# Patient Record
Sex: MALE | Race: WHITE | Employment: FULL TIME | URBAN - METROPOLITAN AREA
[De-identification: names, ages, dates, MRNs, and addresses within clinical notes are randomized per-mention and may not be internally consistent; named-entity substitution may affect disease eponyms.]

---

## 2019-08-14 ENCOUNTER — OFFICE VISIT (OUTPATIENT)
Dept: CARDIOLOGY CLINIC | Age: 54
End: 2019-08-14
Payer: COMMERCIAL

## 2019-08-14 VITALS
SYSTOLIC BLOOD PRESSURE: 118 MMHG | WEIGHT: 206 LBS | BODY MASS INDEX: 25.61 KG/M2 | HEIGHT: 75 IN | DIASTOLIC BLOOD PRESSURE: 82 MMHG | HEART RATE: 63 BPM | RESPIRATION RATE: 16 BRPM

## 2019-08-14 DIAGNOSIS — I51.9 HEART PROBLEM: Primary | ICD-10-CM

## 2019-08-14 PROCEDURE — 93000 ELECTROCARDIOGRAM COMPLETE: CPT | Performed by: INTERNAL MEDICINE

## 2019-08-14 PROCEDURE — 3017F COLORECTAL CA SCREEN DOC REV: CPT | Performed by: INTERNAL MEDICINE

## 2019-08-14 PROCEDURE — G8427 DOCREV CUR MEDS BY ELIG CLIN: HCPCS | Performed by: INTERNAL MEDICINE

## 2019-08-14 PROCEDURE — 1036F TOBACCO NON-USER: CPT | Performed by: INTERNAL MEDICINE

## 2019-08-14 PROCEDURE — 99214 OFFICE O/P EST MOD 30 MIN: CPT | Performed by: INTERNAL MEDICINE

## 2019-08-14 PROCEDURE — G8419 CALC BMI OUT NRM PARAM NOF/U: HCPCS | Performed by: INTERNAL MEDICINE

## 2019-08-14 RX ORDER — BUDESONIDE AND FORMOTEROL FUMARATE DIHYDRATE 160; 4.5 UG/1; UG/1
1 AEROSOL RESPIRATORY (INHALATION) DAILY
COMMUNITY

## 2019-08-14 NOTE — PROGRESS NOTES
CHIEF COMPLAINT: VHD    HISTORY OF PRESENT ILLNESS: Patient is a 49 y.o. male seen at the request of Steph Gibbs DO. No CP. Some episodic SOB attributed to his COPD issues. Medical history includes:   1. Asymptomatic heart murmur noted late 2006.  2. 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 3. CURTIS, 11/30/2006 with degeneration of the middle scallop of the posterior leaflet associated with severe MR, LAE. 4. Cardiac catheterization, 12/07/2006 with normal coronary arteries. Wedge pressure 18 mmHg. RCA dominant. Moderate to severe MR seen with faint filling of pulmonary veins. 5. Remote history of vasectomy. No other surgical procedures. 6. Five to ten cigarettes per day smoking habit, abstinent since 01/2007. One to two beers per day. 7. No drug allergies. 8. No history of MI, rheumatic fever, diabetes mellitus, hypertension or CHF. 9. MV repair, 01/04/2007, Dr. Tushar Lugo, [facility], with a #27 Boland ring. No significant complications. 10. Echo, 03/06/2007 with normal LV size and thickness. Borderline global hypokinesis. EF 40-45%. No significant MR or TR. Normal RVSP. 11. Echo, 02/04/2009 with normal LV size and thickness. Borderline global hypokinesis, EF 40-50%. Moderate LAE, mild CANDELARIA and RVE. Stage II diastolic dysfunction with elevated LA pressure. Normal RVSP. Normal mitral ring with no stenosis. Mild to moderate MR present. 12. Significant improvement in dyspnea 2009 with addition of Altace 5 mg qd. 13. Echo, 04/12/2010. Normal LV size and wall thickness with abnormal septal motion consistent with postop state. EF about 50-55%. No significant mitral stenosis or insufficiency. Trivial TR with normal RVSP. Marked YUNIER. Stage II LV diastolic relaxation abnormalities.

## 2019-10-01 ENCOUNTER — PREP FOR PROCEDURE (OUTPATIENT)
Dept: SURGERY | Age: 54
End: 2019-10-01

## 2019-10-01 RX ORDER — SODIUM CHLORIDE 0.9 % (FLUSH) 0.9 %
10 SYRINGE (ML) INJECTION EVERY 12 HOURS SCHEDULED
Status: CANCELLED | OUTPATIENT
Start: 2019-10-01

## 2019-10-01 RX ORDER — SODIUM CHLORIDE, SODIUM LACTATE, POTASSIUM CHLORIDE, CALCIUM CHLORIDE 600; 310; 30; 20 MG/100ML; MG/100ML; MG/100ML; MG/100ML
INJECTION, SOLUTION INTRAVENOUS CONTINUOUS
Status: CANCELLED | OUTPATIENT
Start: 2019-10-01

## 2019-10-04 ENCOUNTER — HOSPITAL ENCOUNTER (OUTPATIENT)
Dept: PREADMISSION TESTING | Age: 54
Discharge: HOME | End: 2019-10-04
Payer: COMMERCIAL

## 2019-10-04 LAB
ANION GAP SERPL CALCULATED.3IONS-SCNC: 11 MMOL/L (ref 7–16)
BUN BLDV-MCNC: 11 MG/DL (ref 6–20)
CALCIUM SERPL-MCNC: 9.5 MG/DL (ref 8.6–10.2)
CHLORIDE BLD-SCNC: 99 MMOL/L (ref 98–107)
CO2: 29 MMOL/L (ref 22–29)
CREAT SERPL-MCNC: 1 MG/DL (ref 0.7–1.2)
GFR AFRICAN AMERICAN: >60
GFR NON-AFRICAN AMERICAN: >60 ML/MIN/1.73
GLUCOSE BLD-MCNC: 115 MG/DL (ref 74–99)
HCT VFR BLD CALC: 45.9 % (ref 37–54)
HEMOGLOBIN: 15.5 G/DL (ref 12.5–16.5)
MCH RBC QN AUTO: 31.8 PG (ref 26–35)
MCHC RBC AUTO-ENTMCNC: 33.8 % (ref 32–34.5)
MCV RBC AUTO: 94.1 FL (ref 80–99.9)
PDW BLD-RTO: 11.8 FL (ref 11.5–15)
PLATELET # BLD: 184 E9/L (ref 130–450)
PMV BLD AUTO: 9.8 FL (ref 7–12)
POTASSIUM SERPL-SCNC: 4 MMOL/L (ref 3.5–5)
RBC # BLD: 4.88 E12/L (ref 3.8–5.8)
SODIUM BLD-SCNC: 139 MMOL/L (ref 132–146)
WBC # BLD: 4.4 E9/L (ref 4.5–11.5)

## 2019-10-04 PROCEDURE — 36415 COLL VENOUS BLD VENIPUNCTURE: CPT

## 2019-10-04 PROCEDURE — 80048 BASIC METABOLIC PNL TOTAL CA: CPT

## 2019-10-04 PROCEDURE — 85027 COMPLETE CBC AUTOMATED: CPT

## 2019-10-08 ENCOUNTER — ANESTHESIA (OUTPATIENT)
Dept: OPERATING ROOM | Age: 54
End: 2019-10-08
Payer: COMMERCIAL

## 2019-10-08 ENCOUNTER — HOSPITAL ENCOUNTER (OUTPATIENT)
Age: 54
Setting detail: OUTPATIENT SURGERY
Discharge: HOME | End: 2019-10-08
Attending: SURGERY | Admitting: SURGERY
Payer: COMMERCIAL

## 2019-10-08 ENCOUNTER — ANESTHESIA EVENT (OUTPATIENT)
Dept: OPERATING ROOM | Age: 54
End: 2019-10-08
Payer: COMMERCIAL

## 2019-10-08 VITALS
HEIGHT: 75 IN | SYSTOLIC BLOOD PRESSURE: 128 MMHG | WEIGHT: 203 LBS | DIASTOLIC BLOOD PRESSURE: 82 MMHG | TEMPERATURE: 98.2 F | RESPIRATION RATE: 18 BRPM | HEART RATE: 67 BPM | BODY MASS INDEX: 25.24 KG/M2 | OXYGEN SATURATION: 96 %

## 2019-10-08 VITALS
RESPIRATION RATE: 23 BRPM | DIASTOLIC BLOOD PRESSURE: 80 MMHG | OXYGEN SATURATION: 100 % | SYSTOLIC BLOOD PRESSURE: 132 MMHG | TEMPERATURE: 96.1 F

## 2019-10-08 DIAGNOSIS — G89.18 POST-OP PAIN: Primary | ICD-10-CM

## 2019-10-08 PROCEDURE — 3700000000 HC ANESTHESIA ATTENDED CARE: Performed by: SURGERY

## 2019-10-08 PROCEDURE — 6360000002 HC RX W HCPCS: Performed by: SURGERY

## 2019-10-08 PROCEDURE — 7100000001 HC PACU RECOVERY - ADDTL 15 MIN: Performed by: SURGERY

## 2019-10-08 PROCEDURE — 7100000000 HC PACU RECOVERY - FIRST 15 MIN: Performed by: SURGERY

## 2019-10-08 PROCEDURE — 7100000011 HC PHASE II RECOVERY - ADDTL 15 MIN: Performed by: SURGERY

## 2019-10-08 PROCEDURE — 3600000009 HC SURGERY ROBOT BASE: Performed by: SURGERY

## 2019-10-08 PROCEDURE — 6370000000 HC RX 637 (ALT 250 FOR IP): Performed by: ANESTHESIOLOGY

## 2019-10-08 PROCEDURE — 6360000002 HC RX W HCPCS: Performed by: NURSE ANESTHETIST, CERTIFIED REGISTERED

## 2019-10-08 PROCEDURE — 7100000010 HC PHASE II RECOVERY - FIRST 15 MIN: Performed by: SURGERY

## 2019-10-08 PROCEDURE — 6360000002 HC RX W HCPCS: Performed by: ANESTHESIOLOGY

## 2019-10-08 PROCEDURE — 3700000001 HC ADD 15 MINUTES (ANESTHESIA): Performed by: SURGERY

## 2019-10-08 PROCEDURE — 2500000003 HC RX 250 WO HCPCS: Performed by: NURSE ANESTHETIST, CERTIFIED REGISTERED

## 2019-10-08 PROCEDURE — 3600000019 HC SURGERY ROBOT ADDTL 15MIN: Performed by: SURGERY

## 2019-10-08 PROCEDURE — 2500000003 HC RX 250 WO HCPCS: Performed by: SURGERY

## 2019-10-08 PROCEDURE — S2900 ROBOTIC SURGICAL SYSTEM: HCPCS | Performed by: SURGERY

## 2019-10-08 PROCEDURE — 2580000003 HC RX 258: Performed by: SURGERY

## 2019-10-08 PROCEDURE — C1781 MESH (IMPLANTABLE): HCPCS | Performed by: SURGERY

## 2019-10-08 PROCEDURE — 2709999900 HC NON-CHARGEABLE SUPPLY: Performed by: SURGERY

## 2019-10-08 DEVICE — MESH HERN W10XL15CM POLY POLYLACTIC ACID 70% CLLGN 30% GLYC: Type: IMPLANTABLE DEVICE | Site: ABDOMEN | Status: FUNCTIONAL

## 2019-10-08 RX ORDER — OXYCODONE HYDROCHLORIDE AND ACETAMINOPHEN 5; 325 MG/1; MG/1
2 TABLET ORAL PRN
Status: COMPLETED | OUTPATIENT
Start: 2019-10-08 | End: 2019-10-08

## 2019-10-08 RX ORDER — DOCUSATE SODIUM 100 MG/1
100 CAPSULE, LIQUID FILLED ORAL 2 TIMES DAILY
Qty: 50 CAPSULE | Refills: 0 | Status: SHIPPED | OUTPATIENT
Start: 2019-10-08 | End: 2020-09-18

## 2019-10-08 RX ORDER — BUPIVACAINE HYDROCHLORIDE 5 MG/ML
INJECTION, SOLUTION EPIDURAL; INTRACAUDAL PRN
Status: DISCONTINUED | OUTPATIENT
Start: 2019-10-08 | End: 2019-10-08 | Stop reason: ALTCHOICE

## 2019-10-08 RX ORDER — LIDOCAINE HYDROCHLORIDE 10 MG/ML
INJECTION, SOLUTION INFILTRATION; PERINEURAL PRN
Status: DISCONTINUED | OUTPATIENT
Start: 2019-10-08 | End: 2019-10-08 | Stop reason: ALTCHOICE

## 2019-10-08 RX ORDER — OXYCODONE HYDROCHLORIDE AND ACETAMINOPHEN 5; 325 MG/1; MG/1
1 TABLET ORAL PRN
Status: COMPLETED | OUTPATIENT
Start: 2019-10-08 | End: 2019-10-08

## 2019-10-08 RX ORDER — NEOSTIGMINE METHYLSULFATE 1 MG/ML
INJECTION, SOLUTION INTRAVENOUS PRN
Status: DISCONTINUED | OUTPATIENT
Start: 2019-10-08 | End: 2019-10-08 | Stop reason: SDUPTHER

## 2019-10-08 RX ORDER — ONDANSETRON 2 MG/ML
INJECTION INTRAMUSCULAR; INTRAVENOUS PRN
Status: DISCONTINUED | OUTPATIENT
Start: 2019-10-08 | End: 2019-10-08 | Stop reason: SDUPTHER

## 2019-10-08 RX ORDER — HYDROCODONE BITARTRATE AND ACETAMINOPHEN 5; 325 MG/1; MG/1
1 TABLET ORAL EVERY 6 HOURS PRN
Qty: 28 TABLET | Refills: 0 | Status: SHIPPED | OUTPATIENT
Start: 2019-10-08 | End: 2019-10-15

## 2019-10-08 RX ORDER — EPHEDRINE SULFATE/0.9% NACL/PF 50 MG/5 ML
SYRINGE (ML) INTRAVENOUS PRN
Status: DISCONTINUED | OUTPATIENT
Start: 2019-10-08 | End: 2019-10-08 | Stop reason: SDUPTHER

## 2019-10-08 RX ORDER — CEFAZOLIN SODIUM 2 G/50ML
2 SOLUTION INTRAVENOUS
Status: COMPLETED | OUTPATIENT
Start: 2019-10-08 | End: 2019-10-08

## 2019-10-08 RX ORDER — ROCURONIUM BROMIDE 10 MG/ML
INJECTION, SOLUTION INTRAVENOUS PRN
Status: DISCONTINUED | OUTPATIENT
Start: 2019-10-08 | End: 2019-10-08 | Stop reason: SDUPTHER

## 2019-10-08 RX ORDER — MIDAZOLAM HYDROCHLORIDE 1 MG/ML
INJECTION INTRAMUSCULAR; INTRAVENOUS PRN
Status: DISCONTINUED | OUTPATIENT
Start: 2019-10-08 | End: 2019-10-08 | Stop reason: SDUPTHER

## 2019-10-08 RX ORDER — DEXAMETHASONE SODIUM PHOSPHATE 4 MG/ML
INJECTION, SOLUTION INTRA-ARTICULAR; INTRALESIONAL; INTRAMUSCULAR; INTRAVENOUS; SOFT TISSUE PRN
Status: DISCONTINUED | OUTPATIENT
Start: 2019-10-08 | End: 2019-10-08 | Stop reason: SDUPTHER

## 2019-10-08 RX ORDER — FENTANYL CITRATE 50 UG/ML
INJECTION, SOLUTION INTRAMUSCULAR; INTRAVENOUS PRN
Status: DISCONTINUED | OUTPATIENT
Start: 2019-10-08 | End: 2019-10-08 | Stop reason: SDUPTHER

## 2019-10-08 RX ORDER — GLYCOPYRROLATE 0.2 MG/ML
INJECTION INTRAMUSCULAR; INTRAVENOUS PRN
Status: DISCONTINUED | OUTPATIENT
Start: 2019-10-08 | End: 2019-10-08 | Stop reason: SDUPTHER

## 2019-10-08 RX ORDER — PROPOFOL 10 MG/ML
INJECTION, EMULSION INTRAVENOUS PRN
Status: DISCONTINUED | OUTPATIENT
Start: 2019-10-08 | End: 2019-10-08 | Stop reason: SDUPTHER

## 2019-10-08 RX ORDER — LIDOCAINE HYDROCHLORIDE 20 MG/ML
INJECTION, SOLUTION EPIDURAL; INFILTRATION; INTRACAUDAL; PERINEURAL PRN
Status: DISCONTINUED | OUTPATIENT
Start: 2019-10-08 | End: 2019-10-08 | Stop reason: SDUPTHER

## 2019-10-08 RX ORDER — SODIUM CHLORIDE 0.9 % (FLUSH) 0.9 %
10 SYRINGE (ML) INJECTION EVERY 12 HOURS SCHEDULED
Status: DISCONTINUED | OUTPATIENT
Start: 2019-10-08 | End: 2019-10-08 | Stop reason: HOSPADM

## 2019-10-08 RX ORDER — MEPERIDINE HYDROCHLORIDE 25 MG/ML
12.5 INJECTION INTRAMUSCULAR; INTRAVENOUS; SUBCUTANEOUS EVERY 5 MIN PRN
Status: DISCONTINUED | OUTPATIENT
Start: 2019-10-08 | End: 2019-10-08 | Stop reason: HOSPADM

## 2019-10-08 RX ORDER — DIPHENHYDRAMINE HYDROCHLORIDE 50 MG/ML
12.5 INJECTION INTRAMUSCULAR; INTRAVENOUS
Status: DISCONTINUED | OUTPATIENT
Start: 2019-10-08 | End: 2019-10-08 | Stop reason: HOSPADM

## 2019-10-08 RX ORDER — ONDANSETRON 4 MG/1
4 TABLET, FILM COATED ORAL DAILY PRN
Qty: 12 TABLET | Refills: 0 | Status: SHIPPED | OUTPATIENT
Start: 2019-10-08 | End: 2019-10-20

## 2019-10-08 RX ORDER — SODIUM CHLORIDE, SODIUM LACTATE, POTASSIUM CHLORIDE, CALCIUM CHLORIDE 600; 310; 30; 20 MG/100ML; MG/100ML; MG/100ML; MG/100ML
INJECTION, SOLUTION INTRAVENOUS CONTINUOUS
Status: DISCONTINUED | OUTPATIENT
Start: 2019-10-08 | End: 2019-10-08 | Stop reason: HOSPADM

## 2019-10-08 RX ADMIN — OXYCODONE HYDROCHLORIDE AND ACETAMINOPHEN 1 TABLET: 5; 325 TABLET ORAL at 10:45

## 2019-10-08 RX ADMIN — PROPOFOL 200 MG: 10 INJECTION, EMULSION INTRAVENOUS at 07:27

## 2019-10-08 RX ADMIN — LIDOCAINE HYDROCHLORIDE 100 MG: 20 INJECTION, SOLUTION EPIDURAL; INFILTRATION; INTRACAUDAL; PERINEURAL at 07:27

## 2019-10-08 RX ADMIN — ROCURONIUM BROMIDE 10 MG: 10 SOLUTION INTRAVENOUS at 08:10

## 2019-10-08 RX ADMIN — Medication 20 MG: at 08:30

## 2019-10-08 RX ADMIN — Medication 3 MG: at 08:40

## 2019-10-08 RX ADMIN — FENTANYL CITRATE 100 MCG: 50 INJECTION, SOLUTION INTRAMUSCULAR; INTRAVENOUS at 07:46

## 2019-10-08 RX ADMIN — GLYCOPYRROLATE 0.6 MG: 0.2 INJECTION, SOLUTION INTRAMUSCULAR; INTRAVENOUS at 08:40

## 2019-10-08 RX ADMIN — MIDAZOLAM HYDROCHLORIDE 2 MG: 1 INJECTION, SOLUTION INTRAMUSCULAR; INTRAVENOUS at 07:19

## 2019-10-08 RX ADMIN — HYDROMORPHONE HYDROCHLORIDE 0.25 MG: 1 INJECTION, SOLUTION INTRAMUSCULAR; INTRAVENOUS; SUBCUTANEOUS at 09:17

## 2019-10-08 RX ADMIN — ONDANSETRON HYDROCHLORIDE 4 MG: 2 INJECTION, SOLUTION INTRAMUSCULAR; INTRAVENOUS at 07:37

## 2019-10-08 RX ADMIN — CEFAZOLIN SODIUM 2 G: 2 SOLUTION INTRAVENOUS at 07:19

## 2019-10-08 RX ADMIN — SODIUM CHLORIDE, POTASSIUM CHLORIDE, SODIUM LACTATE AND CALCIUM CHLORIDE: 600; 310; 30; 20 INJECTION, SOLUTION INTRAVENOUS at 07:19

## 2019-10-08 RX ADMIN — FENTANYL CITRATE 100 MCG: 50 INJECTION, SOLUTION INTRAMUSCULAR; INTRAVENOUS at 07:27

## 2019-10-08 RX ADMIN — ROCURONIUM BROMIDE 40 MG: 10 SOLUTION INTRAVENOUS at 07:27

## 2019-10-08 RX ADMIN — DEXAMETHASONE SODIUM PHOSPHATE 8 MG: 4 INJECTION, SOLUTION INTRAMUSCULAR; INTRAVENOUS at 07:37

## 2019-10-08 RX ADMIN — FENTANYL CITRATE 50 MCG: 50 INJECTION, SOLUTION INTRAMUSCULAR; INTRAVENOUS at 07:42

## 2019-10-08 ASSESSMENT — PULMONARY FUNCTION TESTS
PIF_VALUE: 26
PIF_VALUE: 1
PIF_VALUE: 37
PIF_VALUE: 24
PIF_VALUE: 14
PIF_VALUE: 23
PIF_VALUE: 35
PIF_VALUE: 37
PIF_VALUE: 30
PIF_VALUE: 23
PIF_VALUE: 1
PIF_VALUE: 38
PIF_VALUE: 38
PIF_VALUE: 30
PIF_VALUE: 38
PIF_VALUE: 38
PIF_VALUE: 32
PIF_VALUE: 34
PIF_VALUE: 34
PIF_VALUE: 37
PIF_VALUE: 36
PIF_VALUE: 22
PIF_VALUE: 23
PIF_VALUE: 18
PIF_VALUE: 34
PIF_VALUE: 16
PIF_VALUE: 34
PIF_VALUE: 1
PIF_VALUE: 23
PIF_VALUE: 34
PIF_VALUE: 38
PIF_VALUE: 34
PIF_VALUE: 37
PIF_VALUE: 39
PIF_VALUE: 23
PIF_VALUE: 38
PIF_VALUE: 1
PIF_VALUE: 25
PIF_VALUE: 22
PIF_VALUE: 26
PIF_VALUE: 38
PIF_VALUE: 0
PIF_VALUE: 26
PIF_VALUE: 25
PIF_VALUE: 38
PIF_VALUE: 3
PIF_VALUE: 1
PIF_VALUE: 26
PIF_VALUE: 24
PIF_VALUE: 39
PIF_VALUE: 22
PIF_VALUE: 35
PIF_VALUE: 1
PIF_VALUE: 38
PIF_VALUE: 34
PIF_VALUE: 1
PIF_VALUE: 38
PIF_VALUE: 39
PIF_VALUE: 37
PIF_VALUE: 24
PIF_VALUE: 31
PIF_VALUE: 38
PIF_VALUE: 20
PIF_VALUE: 24
PIF_VALUE: 38
PIF_VALUE: 22
PIF_VALUE: 5
PIF_VALUE: 38
PIF_VALUE: 22
PIF_VALUE: 23
PIF_VALUE: 34
PIF_VALUE: 38
PIF_VALUE: 38
PIF_VALUE: 39
PIF_VALUE: 25
PIF_VALUE: 14
PIF_VALUE: 34
PIF_VALUE: 24
PIF_VALUE: 37
PIF_VALUE: 0
PIF_VALUE: 38
PIF_VALUE: 34
PIF_VALUE: 22
PIF_VALUE: 39
PIF_VALUE: 38

## 2019-10-08 ASSESSMENT — PAIN DESCRIPTION - FREQUENCY
FREQUENCY: CONTINUOUS

## 2019-10-08 ASSESSMENT — PAIN DESCRIPTION - LOCATION
LOCATION: ABDOMEN

## 2019-10-08 ASSESSMENT — PAIN DESCRIPTION - ORIENTATION
ORIENTATION: ANTERIOR

## 2019-10-08 ASSESSMENT — PAIN SCALES - GENERAL
PAINLEVEL_OUTOF10: 1
PAINLEVEL_OUTOF10: 4
PAINLEVEL_OUTOF10: 3
PAINLEVEL_OUTOF10: 3
PAINLEVEL_OUTOF10: 4
PAINLEVEL_OUTOF10: 4
PAINLEVEL_OUTOF10: 3
PAINLEVEL_OUTOF10: 4
PAINLEVEL_OUTOF10: 3

## 2019-10-08 ASSESSMENT — PAIN DESCRIPTION - PAIN TYPE
TYPE: SURGICAL PAIN

## 2019-10-08 ASSESSMENT — PAIN DESCRIPTION - DESCRIPTORS
DESCRIPTORS: ACHING

## 2019-10-08 ASSESSMENT — PAIN - FUNCTIONAL ASSESSMENT: PAIN_FUNCTIONAL_ASSESSMENT: 0-10

## 2020-09-18 ENCOUNTER — OFFICE VISIT (OUTPATIENT)
Dept: CARDIOLOGY CLINIC | Age: 55
End: 2020-09-18
Payer: COMMERCIAL

## 2020-09-18 VITALS
SYSTOLIC BLOOD PRESSURE: 110 MMHG | WEIGHT: 201 LBS | HEART RATE: 62 BPM | HEIGHT: 75 IN | BODY MASS INDEX: 24.99 KG/M2 | DIASTOLIC BLOOD PRESSURE: 82 MMHG

## 2020-09-18 PROCEDURE — 99214 OFFICE O/P EST MOD 30 MIN: CPT | Performed by: INTERNAL MEDICINE

## 2020-09-18 PROCEDURE — 93000 ELECTROCARDIOGRAM COMPLETE: CPT | Performed by: INTERNAL MEDICINE

## 2020-09-18 PROCEDURE — G8419 CALC BMI OUT NRM PARAM NOF/U: HCPCS | Performed by: INTERNAL MEDICINE

## 2020-09-18 PROCEDURE — 1036F TOBACCO NON-USER: CPT | Performed by: INTERNAL MEDICINE

## 2020-09-18 PROCEDURE — 3017F COLORECTAL CA SCREEN DOC REV: CPT | Performed by: INTERNAL MEDICINE

## 2020-09-18 PROCEDURE — G8427 DOCREV CUR MEDS BY ELIG CLIN: HCPCS | Performed by: INTERNAL MEDICINE

## 2020-09-18 NOTE — PROGRESS NOTES
CHIEF COMPLAINT: VHD    HISTORY OF PRESENT ILLNESS: Patient is a 48 y.o. male seen at the request of Ave Diaz DO. No CP. Some episodic SOB attributed to his COPD issues. Medical history includes:   1. Asymptomatic heart murmur noted late 2006.  2. 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 3. CURTIS, 11/30/2006 with degeneration of the middle scallop of the posterior leaflet associated with severe MR, LAE. 4. Cardiac catheterization, 12/07/2006 with normal coronary arteries. Wedge pressure 18 mmHg. RCA dominant. Moderate to severe MR seen with faint filling of pulmonary veins. 5. Remote history of vasectomy. No other surgical procedures. 6. Five to ten cigarettes per day smoking habit, abstinent since 01/2007. One to two beers per day. 7. No drug allergies. 8. No history of MI, rheumatic fever, diabetes mellitus, hypertension or CHF. 9. MV repair, 01/04/2007, Dr. Sanjuana Woodward, [facility], with a #27 Boland ring. No significant complications. 10. Echo, 03/06/2007 with normal LV size and thickness. Borderline global hypokinesis. EF 40-45%. No significant MR or TR. Normal RVSP. 11. Echo, 02/04/2009 with normal LV size and thickness. Borderline global hypokinesis, EF 40-50%. Moderate LAE, mild CANDELARIA and RVE. Stage II diastolic dysfunction with elevated LA pressure. Normal RVSP. Normal mitral ring with no stenosis. Mild to moderate MR present. 12. Significant improvement in dyspnea 2009 with addition of Altace 5 mg qd. 13. Echo, 04/12/2010. Normal LV size and wall thickness with abnormal septal motion consistent with postop state. EF about 50-55%. No significant mitral stenosis or insufficiency. Trivial TR with normal RVSP. Marked YUNIER. Stage II LV diastolic relaxation abnormalities. 14. Altace discontinued by patient, 01/2011 because of lightheadedness. Marked symptomatic improvement noted. 15. Echo, 02/22/2011. Normal LV size with mild concentric LVH. Abnormal septal motion consistent with postop state. EF about 50%. Moderate mitral stenosis related to MV repair and ring annuloplasty. Trivial TR, normal RVSP, trivial MR, marked YUNIER, Stage II diastolic relaxation abnormalities. When compared [id number] with the previous echo dated 04/12/2010, there does not appear to have been any substantial change in the MV structure or function. 12. No family history of premature vascular disease. 17. Echo, 01/13/2014. Normal LV size, wall thickness, regional wall motion and systolic function. ~EF 55-60%. Stage I diastolic dysfunction. Elevated LA pressure (E/E' 36). Mean MV gradient 6.4 mmHg, ~MVA 1.7 cm². Mild MR. Similar to measurements obtained 02/22/2011. 18. Viral syndrome 95/6641 complicated by reactive airway disease. 23. Pulmonary function study 01/30/2017. Moderate obstructive ventilatory defect with bronchodilator response. Mild restriction with evidence of air trapping. Normal diffusion capacity. 20. CT of chest 01/2017 (report pending) reportedly showed \"aneurysmal dilation of ascending aorta with maximum diameter 4.1 cm. \"  This is at the upper limits of normal for a patient with his body habitus. 21. CT of chest, 10/2017, (report pending) with contrast, reportedly was unremarkable.       Past Medical History:   Diagnosis Date    Diastolic dysfunction     Heart murmur noted 2006    Inguinal hernia     bilateral    Mitral valve prolapse        Patient Active Problem List   Diagnosis    Diastolic dysfunction    H/O mitral valve repair       Allergies   Allergen Reactions    Naproxen Sodium [Naproxen] Hives       Current Outpatient Medications   Medication Sig Dispense Refill    budesonide-formoterol (SYMBICORT) 160-4.5 MCG/ACT AERO Inhale 1 puff into the lungs daily Instructed to take am of procedure      VENTOLIN  (90 Base) MCG/ACT inhaler Instructed to take am of procedure if needed and bring dos  3    B Complex Vitamins (VITAMIN B COMPLEX PO) Take by mouth daily Ld 10/02/2019      aspirin 81 MG EC tablet Take 162 mg by mouth daily Ld 2019 per dr. Rubina Ignacio multivitamin [facility]) per tablet Take 1 tablet by mouth daily Ld 10/02/2019       No current facility-administered medications for this visit. Social History     Socioeconomic History    Marital status:      Spouse name: Not on file    Number of children: Not on file    Years of education: Not on file    Highest education level: Not on file   Occupational History    Not on file   Social Needs    Financial resource strain: Not on file    Food insecurity     Worry: Not on file     Inability: Not on file    Transportation needs     Medical: Not on file     Non-medical: Not on file   Tobacco Use    Smoking status: Former Smoker     Packs/day: 0.50     Years: 15.00     Pack years: 7.50     Types: Cigarettes     Last attempt to quit: 2007     Years since quittin.7    Smokeless tobacco: Never Used   Substance and Sexual Activity    Alcohol use:  Yes     Alcohol/week: 25.0 - 27.0 standard drinks     Types: 7 Standard drinks or equivalent, 18 - 20 Cans of beer per week     Comment: 20 beers per week    Drug use: No    Sexual activity: Not on file   Lifestyle    Physical activity     Days per week: Not on file     Minutes per session: Not on file    Stress: Not on file   Relationships    Social connections     Talks on phone: Not on file     Gets together: Not on file     Attends Latter-day service: Not on file     Active member of club or organization: Not on file     Attends meetings of clubs or organizations: Not on file     Relationship status: Not on file    Intimate partner violence     Fear of current or ex partner: Not on file     Emotionally abused: Not on file     Physically abused: Not on file     Forced sexual activity: Not on file   Other Topics Concern    Not on file   Social History Narrative    Not on file       Family History   Problem Relation Age of Onset    High Blood Pressure Mother     High Blood Pressure Brother     High Blood Pressure Brother        Review of Systems:  Heart: as above   Lungs: as above   Eyes: denies changes in vision or discharge. Ears: denies changes in hearing or pain. Nose: denies epistaxis or masses   Throat: denies sore throat or trouble swallowing. Neuro: denies numbness, tingling, tremors. Skin: denies rashes or itching. : denies hematuria, dysuria   GI: denies vomiting, diarrhea   Psych: denies mood changed, anxiety, depression. All other systems negative. Physical Exam   /82   Pulse 62   Ht 6' 3\" (1.905 m)   Wt 201 lb (91.2 kg)   BMI 25.12 kg/m²   Constitutional: Oriented to person, place, and time. Well-developed and well-nourished. No distress. Head: Normocephalic and atraumatic. Eyes: EOM are normal. Pupils are equal, round, and reactive to light. Neck: Normal range of motion. Neck supple. No hepatojugular reflux and no JVD present. Carotid bruit is not present. No tracheal deviation present. No thyromegaly present. Cardiovascular: Normal rate, regular rhythm, normal heart sounds and intact distal pulses. Exam reveals no gallop and no friction rub. No murmur heard. Pulmonary/Chest: Effort normal and breath sounds normal. No respiratory distress. No wheezes. No rales. No tenderness. Abdominal: Soft. Bowel sounds are normal. No distension and no mass. No tenderness. No rebound and no guarding. Musculoskeletal: Normal range of motion. No edema and no tenderness. Lymphadenopathy:   No cervical adenopathy. No groin adenopathy. Neurological: Alert and oriented to person, place, and time. Skin: Skin is warm and dry. No rash noted. Not diaphoretic. No erythema. Psychiatric: Normal mood and affect. Behavior is normal.     EKG:  normal sinus rhythm, first degree AVB, nonspecific ST and T waves changes. ASSESSMENT AND PLAN:  Patient Active Problem List   Diagnosis    Diastolic dysfunction    H/O mitral valve repair     1. VDH: MV repair, 01/04/2007, Dr. Igor Pathak, [facility], with a #27 Boland ring. Gustavo Lynch D.O.   Cardiologist  Cardiology, [id number]

## 2021-09-14 ENCOUNTER — OFFICE VISIT (OUTPATIENT)
Dept: CARDIOLOGY CLINIC | Age: 56
End: 2021-09-14
Payer: COMMERCIAL

## 2021-09-14 VITALS
BODY MASS INDEX: 25.48 KG/M2 | HEIGHT: 75 IN | DIASTOLIC BLOOD PRESSURE: 84 MMHG | WEIGHT: 204.9 LBS | HEART RATE: 57 BPM | RESPIRATION RATE: 18 BRPM | SYSTOLIC BLOOD PRESSURE: 128 MMHG

## 2021-09-14 DIAGNOSIS — I51.89 DIASTOLIC DYSFUNCTION: Primary | ICD-10-CM

## 2021-09-14 PROCEDURE — 1036F TOBACCO NON-USER: CPT | Performed by: INTERNAL MEDICINE

## 2021-09-14 PROCEDURE — G8427 DOCREV CUR MEDS BY ELIG CLIN: HCPCS | Performed by: INTERNAL MEDICINE

## 2021-09-14 PROCEDURE — 93000 ELECTROCARDIOGRAM COMPLETE: CPT | Performed by: INTERNAL MEDICINE

## 2021-09-14 PROCEDURE — G8419 CALC BMI OUT NRM PARAM NOF/U: HCPCS | Performed by: INTERNAL MEDICINE

## 2021-09-14 PROCEDURE — 3017F COLORECTAL CA SCREEN DOC REV: CPT | Performed by: INTERNAL MEDICINE

## 2021-09-14 PROCEDURE — 99214 OFFICE O/P EST MOD 30 MIN: CPT | Performed by: INTERNAL MEDICINE

## 2021-09-14 NOTE — PROGRESS NOTES
CHIEF COMPLAINT: VHD    HISTORY OF PRESENT ILLNESS: Patient is a 55 y.o. male seen at the request of Sujit Mcguire DO. No CP. Some episodic SOB attributed to his COPD issues. Medical history includes:   1. Asymptomatic heart murmur noted late 2006.  2. 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 3. CURTIS, 11/30/2006 with degeneration of the middle scallop of the posterior leaflet associated with severe MR, LAE. 4. Cardiac catheterization, 12/07/2006 with normal coronary arteries. Wedge pressure 18 mmHg. RCA dominant. Moderate to severe MR seen with faint filling of pulmonary veins. 5. Remote history of vasectomy. No other surgical procedures. 6. Five to ten cigarettes per day smoking habit, abstinent since 01/2007. One to two beers per day. 7. No drug allergies. 8. No history of MI, rheumatic fever, diabetes mellitus, hypertension or CHF. 9. MV repair, 01/04/2007, Dr. Priscilla Acosta, [facility], with a #27 Boland ring. No significant complications. 10. Echo, 03/06/2007 with normal LV size and thickness. Borderline global hypokinesis. EF 40-45%. No significant MR or TR. Normal RVSP. 11. Echo, 02/04/2009 with normal LV size and thickness. Borderline global hypokinesis, EF 40-50%. Moderate LAE, mild CANDELARIA and RVE. Stage II diastolic dysfunction with elevated LA pressure. Normal RVSP. Normal mitral ring with no stenosis. Mild to moderate MR present. 12. Significant improvement in dyspnea 2009 with addition of Altace 5 mg qd. 13. Echo, 04/12/2010. Normal LV size and wall thickness with abnormal septal motion consistent with postop state. EF about 50-55%. No significant mitral stenosis or insufficiency. Trivial TR with normal RVSP. Marked YUNIER. Stage II LV diastolic relaxation abnormalities. 14. Altace discontinued by patient, 01/2011 because of lightheadedness. Marked symptomatic improvement noted. 15. Echo, 02/22/2011. Normal LV size with mild concentric LVH. Abnormal septal motion consistent with postop state. EF about 50%. Moderate mitral stenosis related to MV repair and ring annuloplasty. Trivial TR, normal RVSP, trivial MR, marked YUNIER, Stage II diastolic relaxation abnormalities. When compared [id number] with the previous echo dated 04/12/2010, there does not appear to have been any substantial change in the MV structure or function. 12. No family history of premature vascular disease. 17. Echo, 01/13/2014. Normal LV size, wall thickness, regional wall motion and systolic function. ~EF 55-60%. Stage I diastolic dysfunction. Elevated LA pressure (E/E' 36). Mean MV gradient 6.4 mmHg, ~MVA 1.7 cm². Mild MR. Similar to measurements obtained 02/22/2011. 18. Viral syndrome 90/5714 complicated by reactive airway disease. 23. Pulmonary function study 01/30/2017. Moderate obstructive ventilatory defect with bronchodilator response. Mild restriction with evidence of air trapping. Normal diffusion capacity. 20. CT of chest 01/2017 (report pending) reportedly showed \"aneurysmal dilation of ascending aorta with maximum diameter 4.1 cm. \"  This is at the upper limits of normal for a patient with his body habitus. 21. CT of chest, 10/2017, (report pending) with contrast, reportedly was unremarkable.       Past Medical History:   Diagnosis Date    Diastolic dysfunction     Heart murmur noted 2006    Inguinal hernia     bilateral    Mitral valve prolapse        Patient Active Problem List   Diagnosis    Diastolic dysfunction    H/O mitral valve repair       Allergies   Allergen Reactions    Naproxen Sodium [Naproxen] Hives       Current Outpatient Medications   Medication Sig Dispense Refill    budesonide-formoterol (SYMBICORT) 160-4.5 MCG/ACT AERO Inhale 1 puff into the lungs daily Instructed to take am of procedure      VENTOLIN  (90 Base) MCG/ACT inhaler Instructed to take am of procedure if needed and bring dos  3    B Complex Vitamins (VITAMIN B COMPLEX PO) Take by mouth daily Ld 10/02/2019      aspirin 81 MG EC tablet Take 162 mg by mouth daily Ld 2019 per dr. Danny Flannery multivitamin [facility]) per tablet Take 1 tablet by mouth daily Ld 10/02/2019       No current facility-administered medications for this visit. Social History     Socioeconomic History    Marital status:      Spouse name: Not on file    Number of children: Not on file    Years of education: Not on file    Highest education level: Not on file   Occupational History    Not on file   Tobacco Use    Smoking status: Former Smoker     Packs/day: 0.50     Years: 15.00     Pack years: 7.50     Types: Cigarettes     Quit date: 2007     Years since quittin.7    Smokeless tobacco: Never Used   Vaping Use    Vaping Use: Never used   Substance and Sexual Activity    Alcohol use: Yes     Alcohol/week: 25.0 - 27.0 standard drinks     Types: 7 Standard drinks or equivalent, 18 - 20 Cans of beer per week     Comment: 20 beers per week    Drug use: No    Sexual activity: Not on file   Other Topics Concern    Not on file   Social History Narrative    Not on file     Social Determinants of Health     Financial Resource Strain:     Difficulty of Paying Living Expenses:    Food Insecurity:     Worried About Running Out of Food in the Last Year:     Jennifer of Food in the Last Year:    Transportation Needs:     Lack of Transportation (Medical):      Lack of Transportation (Non-Medical):    Physical Activity:     Days of Exercise per Week:     Minutes of Exercise per Session:    Stress:     Feeling of Stress :    Social Connections:     Frequency of Communication with Friends and Family:     Frequency of Social Gatherings with Friends and Family:     Attends Denominational Services:     Active Member of Clubs or Organizations:     Attends Club or Organization Meetings:     Marital Status:    Intimate Partner Violence:     Fear of Current or Ex-Partner:     Emotionally Abused:     Physically Abused:     Sexually Abused:        Family History   Problem Relation Age of Onset    High Blood Pressure Mother     High Blood Pressure Brother     High Blood Pressure Brother        Review of Systems:  Heart: as above   Lungs: as above   Eyes: denies changes in vision or discharge. Ears: denies changes in hearing or pain. Nose: denies epistaxis or masses   Throat: denies sore throat or trouble swallowing. Neuro: denies numbness, tingling, tremors. Skin: denies rashes or itching. : denies hematuria, dysuria   GI: denies vomiting, diarrhea   Psych: denies mood changed, anxiety, depression. All other systems negative. Physical Exam   /84   Pulse 57   Resp 18   Ht 6' 3\" (1.905 m)   Wt 204 lb 14.4 oz (92.9 kg)   BMI 25.61 kg/m²   Constitutional: Oriented to person, place, and time. Well-developed and well-nourished. No distress. Head: Normocephalic and atraumatic. Eyes: EOM are normal. Pupils are equal, round, and reactive to light. Neck: Normal range of motion. Neck supple. No hepatojugular reflux and no JVD present. Carotid bruit is not present. No tracheal deviation present. No thyromegaly present. Cardiovascular: Normal rate, regular rhythm, ROSENDO 2/6. Pulmonary/Chest: Effort normal and breath sounds normal. No respiratory distress. No wheezes. No rales. No tenderness. Abdominal: Soft. Bowel sounds are normal. No distension and no mass. No tenderness. No rebound and no guarding. Musculoskeletal: Normal range of motion. No edema and no tenderness. Lymphadenopathy:   No cervical adenopathy. No groin adenopathy. Neurological: Alert and oriented to person, place, and time. Skin: Skin is warm and dry. No rash noted. Not diaphoretic.  No erythema. Psychiatric: Normal mood and affect. Behavior is normal.     EKG:  sinus bradycardia, first degree AVB, nonspecific ST and T waves changes. Echo Summary 6/6/2017:   The patient was in sinus rhythm during the examination. Left ventricular internal dimensions are mildly dilated. Left ventricular wall thickness, regional wall motion and systolic function are normal.   Abnormal septal motion is present consistent with the postoperative state. The left atrium appears to be at least moderately dilated. The right atrium appears to be enlarged. A prosthetic annuloplasty ring is present around the mitral valve. The mean trans-mitral gradient is 8 mmHg with an estimated mitral area of 1.3 cm2, suggesting moderate post-operative mitral stenosis. Right ventricular systolic pressure is within normal limits. The inferior vena cava diameter is normal with normal respiratory variation. ASSESSMENT AND PLAN:  Patient Active Problem List   Diagnosis    Diastolic dysfunction    H/O mitral valve repair     1. VDH: MV repair, 01/04/2007, Dr. Dana Ellington, [facility], with a #27 Boland ring. Stable echo 6/6/17. Yadira Mcnamara D.O.   Cardiologist  Cardiology, [id number]

## 2022-10-03 ENCOUNTER — OFFICE VISIT (OUTPATIENT)
Dept: CARDIOLOGY CLINIC | Age: 57
End: 2022-10-03
Payer: COMMERCIAL

## 2022-10-03 VITALS
SYSTOLIC BLOOD PRESSURE: 126 MMHG | RESPIRATION RATE: 16 BRPM | HEART RATE: 66 BPM | HEIGHT: 75 IN | BODY MASS INDEX: 25.12 KG/M2 | WEIGHT: 202 LBS | DIASTOLIC BLOOD PRESSURE: 90 MMHG

## 2022-10-03 DIAGNOSIS — Z98.890 S/P MVR (MITRAL VALVE REPAIR): ICD-10-CM

## 2022-10-03 DIAGNOSIS — I51.89 DIASTOLIC DYSFUNCTION: Primary | ICD-10-CM

## 2022-10-03 PROCEDURE — G8427 DOCREV CUR MEDS BY ELIG CLIN: HCPCS | Performed by: INTERNAL MEDICINE

## 2022-10-03 PROCEDURE — 3017F COLORECTAL CA SCREEN DOC REV: CPT | Performed by: INTERNAL MEDICINE

## 2022-10-03 PROCEDURE — G8484 FLU IMMUNIZE NO ADMIN: HCPCS | Performed by: INTERNAL MEDICINE

## 2022-10-03 PROCEDURE — 1036F TOBACCO NON-USER: CPT | Performed by: INTERNAL MEDICINE

## 2022-10-03 PROCEDURE — 93000 ELECTROCARDIOGRAM COMPLETE: CPT | Performed by: INTERNAL MEDICINE

## 2022-10-03 PROCEDURE — G8419 CALC BMI OUT NRM PARAM NOF/U: HCPCS | Performed by: INTERNAL MEDICINE

## 2022-10-03 PROCEDURE — 99214 OFFICE O/P EST MOD 30 MIN: CPT | Performed by: INTERNAL MEDICINE

## 2022-10-03 NOTE — PROGRESS NOTES
CHIEF COMPLAINT: VHD    HISTORY OF PRESENT ILLNESS: Patient is a 65 y.o. male seen at the request of Luis Bravo DO. No CP. Some episodic SOB attributed to his COPD issues. Medical history includes:   Asymptomatic heart murmur noted late 2006. 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 CURTIS, 11/30/2006 with degeneration of the middle scallop of the posterior leaflet associated with severe MR, LAE. Cardiac catheterization, 12/07/2006 with normal coronary arteries. Wedge pressure 18 mmHg. RCA dominant. Moderate to severe MR seen with faint filling of pulmonary veins. Remote history of vasectomy. No other surgical procedures. Five to ten cigarettes per day smoking habit, abstinent since 01/2007. One to two beers per day. No drug allergies. No history of MI, rheumatic fever, diabetes mellitus, hypertension or CHF. MV repair, 01/04/2007, Dr. Kirk Pat, [facility], with a #27 Boland ring. No significant complications. Echo, 03/06/2007 with normal LV size and thickness. Borderline global hypokinesis. EF 40-45%. No significant MR or TR. Normal RVSP. Echo, 02/04/2009 with normal LV size and thickness. Borderline global hypokinesis, EF 40-50%. Moderate LAE, mild CANDELARIA and RVE. Stage II diastolic dysfunction with elevated LA pressure. Normal RVSP. Normal mitral ring with no stenosis. Mild to moderate MR present. Significant improvement in dyspnea 2009 with addition of Altace 5 mg qd. Echo, 04/12/2010. Normal LV size and wall thickness with abnormal septal motion consistent with postop state. EF about 50-55%. No significant mitral stenosis or insufficiency. Trivial TR with normal RVSP. Marked YUNIER. Stage II LV diastolic relaxation abnormalities.      Altace discontinued by patient, 01/2011 because of lightheadedness. Marked symptomatic improvement noted. Echo, 02/22/2011. Normal LV size with mild concentric LVH. Abnormal septal motion consistent with postop state. EF about 50%. Moderate mitral stenosis related to MV repair and ring annuloplasty. Trivial TR, normal RVSP, trivial MR, marked YUNIER, Stage II diastolic relaxation abnormalities. When compared [id number] with the previous echo dated 04/12/2010, there does not appear to have been any substantial change in the MV structure or function. No family history of premature vascular disease. Echo, 01/13/2014. Normal LV size, wall thickness, regional wall motion and systolic function. ~EF 55-60%. Stage I diastolic dysfunction. Elevated LA pressure (E/E' 36). Mean MV gradient 6.4 mmHg, ~MVA 1.7 cm². Mild MR. Similar to measurements obtained 02/22/2011. Viral syndrome 17/2415 complicated by reactive airway disease. Pulmonary function study 01/30/2017. Moderate obstructive ventilatory defect with bronchodilator response. Mild restriction with evidence of air trapping. Normal diffusion capacity. CT of chest 01/2017 (report pending) reportedly showed \"aneurysmal dilation of ascending aorta with maximum diameter 4.1 cm. \"  This is at the upper limits of normal for a patient with his body habitus. CT of chest, 10/2017, (report pending) with contrast, reportedly was unremarkable.       Past Medical History:   Diagnosis Date    Diastolic dysfunction     Heart murmur noted 2006    Inguinal hernia     bilateral    Mitral valve prolapse        Patient Active Problem List   Diagnosis    Diastolic dysfunction    H/O mitral valve repair       Allergies   Allergen Reactions    Naproxen Sodium [Naproxen] Hives       Current Outpatient Medications   Medication Sig Dispense Refill    budesonide-formoterol (SYMBICORT) 160-4.5 MCG/ACT AERO Inhale 1 puff into the lungs daily Instructed to take am of procedure      VENTOLIN  (90 Base) MCG/ACT inhaler Instructed to take am of procedure if needed and bring dos  3    B Complex Vitamins (VITAMIN B COMPLEX PO) Take by mouth daily Ld 10/02/2019      aspirin 81 MG EC tablet Take 162 mg by mouth daily Ld 9/26/2019 per dr. Yuli Campos      multivitamin [facility]) per tablet Take 1 tablet by mouth daily Ld 10/02/2019       No current facility-administered medications for this visit. Social History     Socioeconomic History    Marital status:      Spouse name: Not on file    Number of children: Not on file    Years of education: Not on file    Highest education level: Not on file   Occupational History    Not on file   Tobacco Use    Smoking status: Former     Packs/day: 0.50     Years: 15.00     Pack years: 7.50     Types: Cigarettes     Quit date: 1/1/2007     Years since quitting: 15.7    Smokeless tobacco: Never   Vaping Use    Vaping Use: Never used   Substance and Sexual Activity    Alcohol use: Yes     Alcohol/week: 25.0 - 27.0 standard drinks     Types: 7 Standard drinks or equivalent, 18 - 20 Cans of beer per week     Comment: 20 beers per week    Drug use: No    Sexual activity: Not on file   Other Topics Concern    Not on file   Social History Narrative    Not on file     Social Determinants of Health     Financial Resource Strain: Not on file   Food Insecurity: Not on file   Transportation Needs: Not on file   Physical Activity: Not on file   Stress: Not on file   Social Connections: Not on file   Intimate Partner Violence: Not on file   Housing Stability: Not on file       Family History   Problem Relation Age of Onset    High Blood Pressure Mother     High Blood Pressure Brother     High Blood Pressure Brother      Review of Systems:  Heart: as above   Lungs: as above   Eyes: denies changes in vision or discharge. Ears: denies changes in hearing or pain. Nose: denies epistaxis or masses   Throat: denies sore throat or trouble swallowing.    Neuro: denies numbness, tingling, tremors. Skin: denies rashes or itching. : denies hematuria, dysuria   GI: denies vomiting, diarrhea   Psych: denies mood changed, anxiety, depression. All other systems negative. Physical Exam   BP (!) 126/90   Pulse 66   Resp 16   Ht 6' 3\" (1.905 m)   Wt 202 lb (91.6 kg)   BMI 25.25 kg/m²   Constitutional: Oriented to person, place, and time. Well-developed and well-nourished. No distress. Head: Normocephalic and atraumatic. Eyes: EOM are normal. Pupils are equal, round, and reactive to light. Neck: Normal range of motion. Neck supple. No hepatojugular reflux and no JVD present. Carotid bruit is not present. No tracheal deviation present. No thyromegaly present. Cardiovascular: Normal rate, regular rhythm, ROSENDO 2/6. Pulmonary/Chest: Effort normal and breath sounds normal. No respiratory distress. No wheezes. No rales. No tenderness. Abdominal: Soft. Bowel sounds are normal. No distension and no mass. No tenderness. No rebound and no guarding. Musculoskeletal: Normal range of motion. No edema and no tenderness. Lymphadenopathy:   No cervical adenopathy. No groin adenopathy. Neurological: Alert and oriented to person, place, and time. Skin: Skin is warm and dry. No rash noted. Not diaphoretic. No erythema. Psychiatric: Normal mood and affect. Behavior is normal.     EKG:  sinus rhythm, nonspecific ST and T waves changes. Echo Summary 6/6/2017:   The patient was in sinus rhythm during the examination. Left ventricular internal dimensions are mildly dilated. Left ventricular wall thickness, regional wall motion and systolic function are normal.   Abnormal septal motion is present consistent with the postoperative state. The left atrium appears to be at least moderately dilated. The right atrium appears to be enlarged. A prosthetic annuloplasty ring is present around the mitral valve.    The mean trans-mitral gradient is 8 mmHg with an estimated mitral area of 1.3 cm2, suggesting moderate post-operative mitral stenosis. Right ventricular systolic pressure is within normal limits. The inferior vena cava diameter is normal with normal respiratory variation. ASSESSMENT AND PLAN:  Patient Active Problem List   Diagnosis    Diastolic dysfunction    H/O mitral valve repair     1. VDH: MV repair, 01/04/2007, Dr. Rafa Rosales, [facility], with a #27 Boland ring. Stable echo 6/6/17. Echo. Christopher Blanco D.O.   Cardiologist  Cardiology, [facility]

## 2023-02-07 ENCOUNTER — TELEPHONE (OUTPATIENT)
Dept: CARDIOLOGY | Age: 58
End: 2023-02-07

## 2023-02-09 ENCOUNTER — HOSPITAL ENCOUNTER (OUTPATIENT)
Dept: CARDIOLOGY | Age: 58
Discharge: HOME | End: 2023-02-09
Payer: COMMERCIAL

## 2023-02-09 DIAGNOSIS — Z98.890 S/P MVR (MITRAL VALVE REPAIR): ICD-10-CM

## 2023-02-09 PROCEDURE — 93306 TTE W/DOPPLER COMPLETE: CPT

## 2023-06-23 ENCOUNTER — TELEPHONE (OUTPATIENT)
Dept: CARDIOLOGY CLINIC | Age: 58
End: 2023-06-23

## 2023-06-23 NOTE — TELEPHONE ENCOUNTER
Echo with normal heart function and stable mitral valve repair. Nothing to worry about. Giselle Ojeda D.O.   Cardiologist  Cardiology, [address] [facility]

## 2025-07-07 ENCOUNTER — TELEPHONE (OUTPATIENT)
Dept: CARDIOLOGY CLINIC | Age: 60
End: 2025-07-07

## 2025-07-07 NOTE — TELEPHONE ENCOUNTER
Patient calling to schedule with Dr Rhodes as he was advised he has AFIB on Thursday fo this week. Please contact patient to schedule

## 2025-07-07 NOTE — PROGRESS NOTES
OUTPATIENT CARDIOLOGY FOLLOW-UP    Name: Winston Mendez    Age: 60 y.o.    Primary Care Physician: Selwyn Frias DO    Date of Service: 7/8/2025    Chief Complaint: Atrial fibrillation. MR s/p mitral valve repair.     Interim History:    Mr. Mendez is a pleasant 60 year old gentleman who presents today due to newly diagnosed atrial fibrillation, accompanied by his wife. He is followed here by Dr. Rhodes and was last seen in October 2022. His past medical history includes severe MR s/p MV repair in 2007 and COPD.    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 He has had no exertional chest discomfort, dyspnea, palpitations, syncope, orthopnea, or PND. His wife recently purchased a [employer] orion; when he tried it, it read possible atrial fibrillation and a few days later while golfing, he checked his heart rate on his friend's Apple watch and it read AF. He was seen by his primary care physician, Dr. Frias, on July 3 where EKG confirmed AF and he was started on metoprolol and Eliquis.    Today, he remains in AF and appears to be asymptomatic, although he does report fatigue by the end of the day, which he feels could be related to performing physical labor in the heat. He is unsure what his copay for Eliquis will be, as he was given samples by Dr. Nobles. He has had no bleeding issues thus far, but works around sharp equipment and is concerned about potential for injury.       Review of Systems:   Negative except as described above     Past Medical History:  Past Medical History:   Diagnosis Date    Diastolic dysfunction     Heart murmur noted 2006    Inguinal hernia     bilateral    Mitral valve prolapse        Past Surgical History:  Past Surgical History:   Procedure Laterality Date    COLONOSCOPY      DIAGNOSTIC CARDIAC CATH LAB PROCEDURE  12/2006    HERNIA REPAIR Right 10/8/2019    LAPAROSCOPIC ROBOTIC ASSISTED Xi RIGHT INGUINAL HERNIA REPAIR

## 2025-07-08 ENCOUNTER — OFFICE VISIT (OUTPATIENT)
Dept: CARDIOLOGY CLINIC | Age: 60
End: 2025-07-08
Payer: COMMERCIAL

## 2025-07-08 ENCOUNTER — TELEPHONE (OUTPATIENT)
Dept: CARDIOLOGY CLINIC | Age: 60
End: 2025-07-08

## 2025-07-08 VITALS
BODY MASS INDEX: 24.28 KG/M2 | TEMPERATURE: 96.9 F | WEIGHT: 195.3 LBS | HEART RATE: 64 BPM | DIASTOLIC BLOOD PRESSURE: 60 MMHG | HEIGHT: 75 IN | OXYGEN SATURATION: 96 % | SYSTOLIC BLOOD PRESSURE: 106 MMHG | RESPIRATION RATE: 18 BRPM

## 2025-07-08 DIAGNOSIS — I51.9 HEART PROBLEM: Primary | ICD-10-CM

## 2025-07-08 DIAGNOSIS — I48.91 ATRIAL FIBRILLATION, UNSPECIFIED TYPE (HCC): Primary | ICD-10-CM

## 2025-07-08 DIAGNOSIS — I48.91 ATRIAL FIBRILLATION, UNSPECIFIED TYPE (HCC): ICD-10-CM

## 2025-07-08 PROCEDURE — 93000 ELECTROCARDIOGRAM COMPLETE: CPT | Performed by: CLINICAL NURSE SPECIALIST

## 2025-07-08 PROCEDURE — 99215 OFFICE O/P EST HI 40 MIN: CPT | Performed by: CLINICAL NURSE SPECIALIST

## 2025-07-08 RX ORDER — METOPROLOL TARTRATE 25 MG/1
25 TABLET, FILM COATED ORAL EVERY 12 HOURS
COMMUNITY
Start: 2025-07-03

## 2025-07-08 NOTE — PATIENT INSTRUCTIONS
Continue Eliquis 5 mg twice daily and metoprolol 25 daily     We will call you to schedule the CURTIS and cardioversion     Follow up with Dr. Rhodes in about four to six weeks after the cardioversion

## 2025-07-08 NOTE — PROGRESS NOTES
Patient presented today and was given samples of Eliquis 5 mg. The medication is being monitored by Dr. Rhodes.     Sample drug name: Eliquis 5 mg   Sample drug lot #: [id number]  Sample drug expiration date: 7/26   How many sample boxes and/or bottles given: 4   Samples provided by: Kylie Hickman CMA

## 2025-07-08 NOTE — TELEPHONE ENCOUNTER
----- Message from JOSE Hunter - CNS sent at 7/8/2025  1:08 PM EDT -----  Aniceto Galindo,     Can you please schedule Mr. Mendez for CURTIS/DCCV either at [place] or [place]? He wouldn't be able to schedule between July 22 to July 26, but will go to either hospital any other time. He's Dr. Rhodes's patient, but understands another physician may be performing.     Thank you!

## 2025-07-09 NOTE — PROGRESS NOTES
[facility] PRE-ADMISSION TESTING INSTRUCTIONS  PROCEDURE TIME: 0730 AM                        ARRIVAL TIME: 0500 AM  The Preadmission Testing patient is instructed accordingly using the following criteria (check applicable):    ARRIVAL INSTRUCTIONS:  [x] Parking the day of Surgery is located in the Main Entrance lot.  Upon entering the door, make an immediate right to the surgery reception desk    [x] Bring photo ID and insurance card    [] Bring in a copy of Living will or Durable Power of  papers.    [x] Please be sure to arrange for responsible adult to provide transportation to and from the hospital    [x] Please arrange for responsible adult to be with you for the 24 hour period post procedure due to having anesthesia    [x] If you awake am of surgery not feeling well or have temperature >100 please call [phone]    GENERAL INSTRUCTIONS:    [x] May have clear liquids until 4 hours prior to surgery. Examples include water, fruit juices (no pulp), jello, popsicles, black coffee or tea, beef or chicken broth.               No gum, candy or mints.    [x] You may brush your teeth, but do not swallow any water    [x] Take medications as instructed with 1-2 oz of water (METOPROLOL)    [x] Stop herbal supplements and vitamins 5 days prior to procedure    [x] Follow preop dosing of blood thinners per physician instructions    [] Take 1/2 dose of evening insulin, but no insulin after midnight    [] No oral diabetic medications after midnight    [] If diabetic and have low blood sugar or feel symptomatic, take 1-2oz apple juice only    [] Bring inhalers day of surgery    [] Bring C-PAP/ Bi-Pap day of surgery    [] Bring urine specimen day of surgery    [x] Shower or bath with soap, lather and rinse well, AM of Surgery, no lotion, powders or creams to surgical site    [] Follow bowel prep as instructed per surgeon    [x] No tobacco products within 24 hours of surgery     [x] No

## 2025-07-11 ENCOUNTER — ANESTHESIA EVENT (OUTPATIENT)
Age: 60
End: 2025-07-11
Payer: COMMERCIAL

## 2025-07-11 NOTE — ANESTHESIA PRE PROCEDURE
Department of Anesthesiology  Preprocedure Note       Name:  Winston Mendez   Age:  60 y.o.  :  1965                                          MRN:  [id number]         Date:  2025      Surgeon: Cardiology    Procedure:  CURTIS +/- DCCV     Medications prior to admission:   Prior to Admission medications    Medication Sig Start Date End Date Taking? Authorizing Provider   B Complex Vitamins (VITAMIN B COMPLEX PO) Take by mouth daily   Yes Marilynn Williamson MD   multivitamin (THERAGRAN) per tablet Take 1 tablet by mouth daily   Yes Marilynn Williamson MD   metoprolol tartrate (LOPRESSOR) 25 MG tablet Take 1 tablet by mouth in the morning and 1 tablet in the evening.  Patient taking differently: Take 1 tablet by mouth daily 7/3/25   Marilynn Williamson MD   apixaban (ELIQUIS) 5 MG TABS tablet Take 1 tablet by mouth 2 times daily 25   Kylie Asencio, APRN - CNS   budesonide-formoterol (SYMBICORT) 160-4.5 MCG/ACT AERO Inhale 1 puff into the lungs daily    Marilynn Williamson MD   VENTOLIN  (90 Base) MCG/ACT inhaler Inhale 1 puff into the lungs every 4 hours as needed 10/5/17   Marilynn Williamson MD       Current medications:    Current Outpatient Medications   Medication Sig Dispense Refill    B Complex Vitamins (VITAMIN B COMPLEX PO) Take by mouth daily      multivitamin (THERAGRAN) per tablet Take 1 tablet by mouth daily      metoprolol tartrate (LOPRESSOR) 25 MG tablet Take 1 tablet by mouth in the morning and 1 tablet in the evening. (Patient taking differently: Take 1 tablet by mouth daily)      apixaban (ELIQUIS) 5 MG TABS tablet Take 1 tablet by mouth 2 times daily 60 tablet 5    budesonide-formoterol (SYMBICORT) 160-4.5 MCG/ACT AERO Inhale 1 puff into the lungs daily      VENTOLIN  (90 Base) MCG/ACT inhaler Inhale 1 puff into the lungs every 4 hours as needed  3     No current facility-administered medications for this encounter.       Allergies:    Allergies   Allergen

## 2025-07-14 ENCOUNTER — ANESTHESIA (OUTPATIENT)
Age: 60
End: 2025-07-14
Payer: COMMERCIAL

## 2025-07-14 ENCOUNTER — HOSPITAL ENCOUNTER (OUTPATIENT)
Age: 60
Discharge: HOME | End: 2025-07-16
Payer: COMMERCIAL

## 2025-07-14 VITALS
OXYGEN SATURATION: 96 % | WEIGHT: 192 LBS | DIASTOLIC BLOOD PRESSURE: 76 MMHG | TEMPERATURE: 97 F | SYSTOLIC BLOOD PRESSURE: 107 MMHG | BODY MASS INDEX: 23.87 KG/M2 | HEART RATE: 49 BPM | HEIGHT: 75 IN | RESPIRATION RATE: 20 BRPM

## 2025-07-14 DIAGNOSIS — I48.91 ATRIAL FIBRILLATION, UNSPECIFIED TYPE (HCC): ICD-10-CM

## 2025-07-14 PROCEDURE — 2580000003 HC RX 258: Performed by: NURSE ANESTHETIST, CERTIFIED REGISTERED

## 2025-07-14 PROCEDURE — 3700000001 HC ADD 15 MINUTES (ANESTHESIA)

## 2025-07-14 PROCEDURE — 7100000011 HC PHASE II RECOVERY - ADDTL 15 MIN

## 2025-07-14 PROCEDURE — 93325 DOPPLER ECHO COLOR FLOW MAPG: CPT | Performed by: INTERNAL MEDICINE

## 2025-07-14 PROCEDURE — 92960 CARDIOVERSION ELECTRIC EXT: CPT

## 2025-07-14 PROCEDURE — 3700000000 HC ANESTHESIA ATTENDED CARE

## 2025-07-14 PROCEDURE — 92960 CARDIOVERSION ELECTRIC EXT: CPT | Performed by: INTERNAL MEDICINE

## 2025-07-14 PROCEDURE — 93312 ECHO TRANSESOPHAGEAL: CPT | Performed by: INTERNAL MEDICINE

## 2025-07-14 PROCEDURE — 93320 DOPPLER ECHO COMPLETE: CPT | Performed by: INTERNAL MEDICINE

## 2025-07-14 PROCEDURE — 6360000002 HC RX W HCPCS: Performed by: NURSE ANESTHETIST, CERTIFIED REGISTERED

## 2025-07-14 PROCEDURE — 7100000010 HC PHASE II RECOVERY - FIRST 15 MIN

## 2025-07-14 RX ORDER — DIPHENHYDRAMINE HYDROCHLORIDE 50 MG/ML
12.5 INJECTION, SOLUTION INTRAMUSCULAR; INTRAVENOUS
Status: DISCONTINUED | OUTPATIENT
Start: 2025-07-14 | End: 2025-07-17 | Stop reason: HOSPADM

## 2025-07-14 RX ORDER — PROPOFOL 10 MG/ML
INJECTION, EMULSION INTRAVENOUS
Status: DISCONTINUED | OUTPATIENT
Start: 2025-07-14 | End: 2025-07-14 | Stop reason: SDUPTHER

## 2025-07-14 RX ORDER — SODIUM CHLORIDE 9 MG/ML
INJECTION, SOLUTION INTRAVENOUS PRN
Status: DISCONTINUED | OUTPATIENT
Start: 2025-07-14 | End: 2025-07-17 | Stop reason: HOSPADM

## 2025-07-14 RX ORDER — MEPERIDINE HYDROCHLORIDE 50 MG/ML
12.5 INJECTION INTRAMUSCULAR; INTRAVENOUS; SUBCUTANEOUS
Status: DISCONTINUED | OUTPATIENT
Start: 2025-07-14 | End: 2025-07-17 | Stop reason: HOSPADM

## 2025-07-14 RX ORDER — SODIUM CHLORIDE 0.9 % (FLUSH) 0.9 %
5-40 SYRINGE (ML) INJECTION PRN
Status: DISCONTINUED | OUTPATIENT
Start: 2025-07-14 | End: 2025-07-17 | Stop reason: HOSPADM

## 2025-07-14 RX ORDER — LABETALOL HYDROCHLORIDE 5 MG/ML
5 INJECTION, SOLUTION INTRAVENOUS
Status: DISCONTINUED | OUTPATIENT
Start: 2025-07-14 | End: 2025-07-17 | Stop reason: HOSPADM

## 2025-07-14 RX ORDER — SODIUM CHLORIDE 0.9 % (FLUSH) 0.9 %
5-40 SYRINGE (ML) INJECTION EVERY 12 HOURS SCHEDULED
Status: DISCONTINUED | OUTPATIENT
Start: 2025-07-14 | End: 2025-07-17 | Stop reason: HOSPADM

## 2025-07-14 RX ORDER — FENTANYL CITRATE 50 UG/ML
25 INJECTION, SOLUTION INTRAMUSCULAR; INTRAVENOUS EVERY 5 MIN PRN
Status: DISCONTINUED | OUTPATIENT
Start: 2025-07-14 | End: 2025-07-17 | Stop reason: HOSPADM

## 2025-07-14 RX ORDER — SODIUM CHLORIDE 9 MG/ML
INJECTION, SOLUTION INTRAVENOUS
Status: DISCONTINUED | OUTPATIENT
Start: 2025-07-14 | End: 2025-07-14 | Stop reason: SDUPTHER

## 2025-07-14 RX ORDER — HYDRALAZINE HYDROCHLORIDE 20 MG/ML
5 INJECTION INTRAMUSCULAR; INTRAVENOUS
Status: DISCONTINUED | OUTPATIENT
Start: 2025-07-14 | End: 2025-07-17 | Stop reason: HOSPADM

## 2025-07-14 RX ORDER — PROCHLORPERAZINE EDISYLATE 5 MG/ML
5 INJECTION INTRAMUSCULAR; INTRAVENOUS
Status: DISCONTINUED | OUTPATIENT
Start: 2025-07-14 | End: 2025-07-17 | Stop reason: HOSPADM

## 2025-07-14 RX ADMIN — PROPOFOL 250 MG: 10 INJECTION, EMULSION INTRAVENOUS at 07:36

## 2025-07-14 RX ADMIN — SODIUM CHLORIDE: 9 INJECTION, SOLUTION INTRAVENOUS at 07:22

## 2025-07-14 ASSESSMENT — PAIN - FUNCTIONAL ASSESSMENT
PAIN_FUNCTIONAL_ASSESSMENT: 0-10
PAIN_FUNCTIONAL_ASSESSMENT: 0-10

## 2025-07-14 NOTE — ANESTHESIA POSTPROCEDURE EVALUATION
Department of Anesthesiology  Postprocedure Note    Patient: Winston Mendez  MRN: [id number]  YOB: 1965  Date of evaluation: 7/14/2025    Procedure Summary       Date: 07/14/25 Room / Location: [facility] Cardiology    Anesthesia Start: 0722 Anesthesia Stop: 0746    Procedure: CURTIS W/ POSSIBLE CARDIOVERSION (PRN CONTRAST/BUBBLE/3D) Diagnosis: Atrial fibrillation, unspecified type (HCC)    Scheduled Providers:  Responsible Provider: Cliff Don DO    Anesthesia Type: MAC ASA Status: 3            Anesthesia Type: No value filed.    Kerry Phase I: Kerry Score: 10    Kerry Phase II:      Anesthesia Post Evaluation    Patient location during evaluation: PACU  Patient participation: complete - patient participated  Level of consciousness: awake and alert  Pain score: 0  Airway patency: patent  Nausea & Vomiting: no nausea and no vomiting  Cardiovascular status: hemodynamically stable  Respiratory status: acceptable  Comments: S/P successful DCCV x 1 shock  Pain management: adequate      No notable events documented.

## 2025-07-14 NOTE — PROGRESS NOTES
Patient seen, examined in CVL area    H&P reviewed. Updated    CURTIS/DCCV requested for A fib    Denies dysphagia.    Medical/ Surgical history: Reviewed    Allergies:  Reviewed    Medications: reviewed.    Labs/imaging studies: Reviewed.        Physical exam:     Vitals: Noted    Awake, alert, no distress.  Pharynx - Clear   NECK: No stridor.   Lungs: No accessory muscles use. Lung auscultation - Clear  HEART: Irregular  rate and rhythm, 1/6 systolic murmur  NEURO: oriented to person, place          Impression/Recommendations:    A Fib - Risk benefits of CURTIS/DCCV discussed. Agreeable.     S/p MV repair      Manpreet Gillette MD  7/14/2025

## 2025-07-15 LAB
ECHO BSA: 2.15 M2
ECHO LV EF PHYSICIAN: 50 %

## 2025-07-21 ENCOUNTER — TELEPHONE (OUTPATIENT)
Dept: CARDIOLOGY CLINIC | Age: 60
End: 2025-07-21

## 2025-07-21 ENCOUNTER — CLINICAL SUPPORT (OUTPATIENT)
Dept: CARDIOLOGY CLINIC | Age: 60
End: 2025-07-21
Payer: COMMERCIAL

## 2025-07-21 DIAGNOSIS — I48.0 PAROXYSMAL ATRIAL FIBRILLATION (HCC): Primary | ICD-10-CM

## 2025-07-21 PROCEDURE — 93000 ELECTROCARDIOGRAM COMPLETE: CPT | Performed by: INTERNAL MEDICINE

## 2025-07-21 NOTE — PROGRESS NOTES
Pt presents for EKG, per Dr. Rhodes.     Pt had cardioversion on 2025, but thinks may be back in Afib.     HR from EK    Smita Vick CMA

## 2025-07-22 ENCOUNTER — RESULTS FOLLOW-UP (OUTPATIENT)
Dept: CARDIOLOGY | Age: 60
End: 2025-07-22

## 2025-07-22 DIAGNOSIS — I48.19 PERSISTENT ATRIAL FIBRILLATION (HCC): Primary | ICD-10-CM

## 2025-07-22 RX ORDER — FLECAINIDE ACETATE 100 MG/1
100 TABLET ORAL 2 TIMES DAILY
COMMUNITY
End: 2025-07-22 | Stop reason: SDUPTHER

## 2025-07-22 RX ORDER — FLECAINIDE ACETATE 100 MG/1
100 TABLET ORAL 2 TIMES DAILY
Qty: 60 TABLET | Refills: 5 | Status: SHIPPED | OUTPATIENT
Start: 2025-07-22

## 2025-07-22 NOTE — TELEPHONE ENCOUNTER
Patient notified and understood instructions.  DCCV scheduled on 7/31 at 1:30pm [facility].

## 2025-07-22 NOTE — TELEPHONE ENCOUNTER
----- Message from Dr. Charlene Rhodes, DO sent at 7/22/2025  7:53 AM EDT -----  He is back in afib as he suspected.     Please start flecainide 100 mg BID. Schedule DCCV at least a week after starting.    Keep August OV.    Charlene Rhodes D.O.  Cardiologist  Cardiology, [facility]

## 2025-07-22 NOTE — TELEPHONE ENCOUNTER
See result note.    Charlene Rhodes D.O.  Cardiologist  Cardiology, [facility]

## 2025-07-31 ENCOUNTER — ANESTHESIA (OUTPATIENT)
Age: 60
End: 2025-07-31
Payer: COMMERCIAL

## 2025-07-31 ENCOUNTER — HOSPITAL ENCOUNTER (OUTPATIENT)
Age: 60
Discharge: HOME | End: 2025-07-31
Attending: INTERNAL MEDICINE | Admitting: INTERNAL MEDICINE
Payer: COMMERCIAL

## 2025-07-31 ENCOUNTER — ANESTHESIA EVENT (OUTPATIENT)
Age: 60
End: 2025-07-31
Payer: COMMERCIAL

## 2025-07-31 VITALS
HEART RATE: 52 BPM | HEIGHT: 75 IN | OXYGEN SATURATION: 98 % | SYSTOLIC BLOOD PRESSURE: 118 MMHG | WEIGHT: 190 LBS | BODY MASS INDEX: 23.62 KG/M2 | DIASTOLIC BLOOD PRESSURE: 78 MMHG | TEMPERATURE: 97.6 F | RESPIRATION RATE: 15 BRPM

## 2025-07-31 DIAGNOSIS — I48.91 FLUTTER-FIBRILLATION (HCC): ICD-10-CM

## 2025-07-31 DIAGNOSIS — I48.92 FLUTTER-FIBRILLATION (HCC): ICD-10-CM

## 2025-07-31 LAB — ECHO BSA: 2.14 M2

## 2025-07-31 PROCEDURE — 7100000010 HC PHASE II RECOVERY - FIRST 15 MIN: Performed by: INTERNAL MEDICINE

## 2025-07-31 PROCEDURE — 3700000000 HC ANESTHESIA ATTENDED CARE: Performed by: INTERNAL MEDICINE

## 2025-07-31 PROCEDURE — 6360000002 HC RX W HCPCS: Performed by: NURSE ANESTHETIST, CERTIFIED REGISTERED

## 2025-07-31 PROCEDURE — 92960 CARDIOVERSION ELECTRIC EXT: CPT

## 2025-07-31 PROCEDURE — 2580000003 HC RX 258: Performed by: NURSE ANESTHETIST, CERTIFIED REGISTERED

## 2025-07-31 PROCEDURE — 7100000011 HC PHASE II RECOVERY - ADDTL 15 MIN: Performed by: INTERNAL MEDICINE

## 2025-07-31 PROCEDURE — 93005 ELECTROCARDIOGRAM TRACING: CPT | Performed by: INTERNAL MEDICINE

## 2025-07-31 PROCEDURE — 3700000001 HC ADD 15 MINUTES (ANESTHESIA): Performed by: INTERNAL MEDICINE

## 2025-07-31 PROCEDURE — 2709999900 HC NON-CHARGEABLE SUPPLY: Performed by: INTERNAL MEDICINE

## 2025-07-31 PROCEDURE — 92960 CARDIOVERSION ELECTRIC EXT: CPT | Performed by: INTERNAL MEDICINE

## 2025-07-31 RX ORDER — SODIUM CHLORIDE 9 MG/ML
INJECTION, SOLUTION INTRAVENOUS
Status: DISCONTINUED | OUTPATIENT
Start: 2025-07-31 | End: 2025-07-31 | Stop reason: SDUPTHER

## 2025-07-31 RX ORDER — PROPOFOL 10 MG/ML
INJECTION, EMULSION INTRAVENOUS
Status: DISCONTINUED | OUTPATIENT
Start: 2025-07-31 | End: 2025-07-31 | Stop reason: SDUPTHER

## 2025-07-31 RX ADMIN — SODIUM CHLORIDE: 9 INJECTION, SOLUTION INTRAVENOUS at 13:54

## 2025-07-31 RX ADMIN — PROPOFOL 70 MG: 10 INJECTION, EMULSION INTRAVENOUS at 14:07

## 2025-07-31 ASSESSMENT — LIFESTYLE VARIABLES: SMOKING_STATUS: 0

## 2025-07-31 NOTE — ANESTHESIA PRE PROCEDURE
Department of Anesthesiology  Preprocedure Note       Name:  Winston Mendez   Age:  60 y.o.  :  1965                                          MRN:  [id number]         Date:  2025      Surgeon: Cardiology    Procedure:  DCCV * No procedures listed *    Medications prior to admission:   Prior to Admission medications    Medication Sig Start Date End Date Taking? Authorizing Provider   flecainide (TAMBOCOR) 100 MG tablet Take 1 tablet by mouth 2 times daily 25  Yes Charlene Rhodes DO   metoprolol tartrate (LOPRESSOR) 25 MG tablet Take 1 tablet by mouth in the morning and 1 tablet in the evening.  Patient taking differently: Take 1 tablet by mouth daily 7/3/25  Yes Marilynn Williamson MD   apixaban (ELIQUIS) 5 MG TABS tablet Take 1 tablet by mouth 2 times daily 25  Yes Kylie Asencio APRN - CNS   budesonide-formoterol (SYMBICORT) 160-4.5 MCG/ACT AERO Inhale 1 puff into the lungs daily   Yes Marilynn Williamson MD   B Complex Vitamins (VITAMIN B COMPLEX PO) Take by mouth daily   Yes Marilynn Williamson MD   VENTOLIN  (90 Base) MCG/ACT inhaler Inhale 1 puff into the lungs every 4 hours as needed 10/5/17   Marilynn Williamson MD   multivitamin (THERAGRAN) per tablet Take 1 tablet by mouth daily    Marilynn Williamson MD       Current medications:    No current facility-administered medications for this encounter.       Allergies:    Allergies   Allergen Reactions    Naproxen Sodium [Naproxen] Hives       Problem List:    Patient Active Problem List   Diagnosis Code    Diastolic dysfunction I51.89    H/O mitral valve repair Z98.890    Atrial fibrillation (HCC) I48.91       Past Medical History:        Diagnosis Date    A-fib (HCC)     Diastolic dysfunction     Heart murmur noted        Past Surgical History:        Procedure Laterality Date    COLONOSCOPY      DIAGNOSTIC CARDIAC CATH LAB PROCEDURE  2006    HERNIA REPAIR Right 10/08/2019    LAPAROSCOPIC ROBOTIC ASSISTED Xi

## 2025-07-31 NOTE — ANESTHESIA POSTPROCEDURE EVALUATION
Department of Anesthesiology  Postprocedure Note    Patient: Winston Mendez  MRN: [id number]  YOB: 1965  Date of evaluation: 8/1/2025    Procedure Summary       Date: 07/31/25 Room / Location: [facility] Cardiac Cath Lab    Anesthesia Start:  Anesthesia Stop:     Procedure: CARDIOVERSION EXTERNAL Diagnosis:       Flutter-fibrillation (HCC)      Flutter-fibrillation (HCC)    Scheduled Providers: Padmaja Morgan MD Responsible Provider:     Anesthesia Type: MAC ASA Status: 3            Anesthesia Type: No value filed.    Kerry Phase I:      Kerry Phase II:      Anesthesia Post Evaluation    Patient location during evaluation: PACU  Patient participation: complete - patient participated  Level of consciousness: awake  Pain score: 3  Airway patency: patent  Nausea & Vomiting: no nausea and no vomiting  Cardiovascular status: blood pressure returned to baseline  Respiratory status: acceptable  Hydration status: euvolemic      No notable events documented.

## 2025-07-31 NOTE — H&P
CHIEF COMPLAINT: PAF/VHD    HISTORY OF PRESENT ILLNESS: Patient is a 60 y.o. male seen at the request of Selwyn Frias DO.     Presents for [facility].     Medical history includes:   Asymptomatic heart murmur noted late 2006.  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   CURTIS, 11/30/2006 with degeneration of the middle scallop of the posterior leaflet associated with severe MR, LAE.  Cardiac catheterization, 12/07/2006 with normal coronary arteries.  Wedge pressure 18 mmHg.  RCA dominant.  Moderate to severe MR seen with faint filling of pulmonary veins.    Remote history of vasectomy.  No other surgical procedures.  Five to ten cigarettes per day smoking habit, abstinent since 01/2007.  One to two beers per day.    No drug allergies.  No history of MI, rheumatic fever, diabetes mellitus, hypertension or CHF.  MV repair, 01/04/2007, [place]. Chacko, [facility], with a #27 Boland ring.  No significant complications.    Echo, 03/06/2007 with normal LV size and thickness.  Borderline global hypokinesis.  EF 40-45%.  No significant MR or TR.   Normal RVSP.  Echo, 02/04/2009 with normal LV size and thickness.  Borderline global hypokinesis, EF 40-50%.    Moderate LAE, mild CANDELARIA and RVE.  Stage II diastolic dysfunction with elevated LA pressure.  Normal RVSP.  Normal mitral ring with no stenosis.  Mild to moderate MR present.  Significant improvement in dyspnea 2009 with addition of Altace 5 mg qd.  Echo, 04/12/2010.   Normal LV size and wall thickness with abnormal septal motion consistent with postop state.  EF about 50-55%.   No significant mitral stenosis or insufficiency.   Trivial TR with normal RVSP.  Marked YUNIER.  Stage II LV diastolic relaxation abnormalities.     Altace discontinued by patient, 01/2011 because of lightheadedness.    dilated.   The right atrium appears to be enlarged.   A prosthetic annuloplasty ring is present around the mitral valve.   The mean trans-mitral gradient is 8 mmHg with an estimated mitral area of 1.3 cm2, suggesting moderate post-operative mitral stenosis.   Right ventricular systolic pressure is within normal limits.   The inferior vena cava diameter is normal with normal respiratory variation.    ASSESSMENT AND PLAN:  Patient Active Problem List   Diagnosis    Diastolic dysfunction    H/O mitral valve repair    Atrial fibrillation (HCC)     1. PAF:     Presents for ELLOITV.   Eliquis.     2. VDH: MV repair, 01/04/2007, Dr. Chacko, [facility], with a #27 Boland ring.      Charlene Rhodes D.O.  Cardiologist  Cardiology, [facility]

## 2025-08-01 LAB
EKG ATRIAL RATE: 54 BPM
EKG P AXIS: -21 DEGREES
EKG P-R INTERVAL: 304 MS
EKG Q-T INTERVAL: 514 MS
EKG QRS DURATION: 100 MS
EKG QTC CALCULATION (BAZETT): 487 MS
EKG R AXIS: 25 DEGREES
EKG T AXIS: 35 DEGREES
EKG VENTRICULAR RATE: 54 BPM

## 2025-08-01 PROCEDURE — 93010 ELECTROCARDIOGRAM REPORT: CPT | Performed by: INTERNAL MEDICINE

## 2025-08-27 ENCOUNTER — OFFICE VISIT (OUTPATIENT)
Dept: CARDIOLOGY CLINIC | Age: 60
End: 2025-08-27
Payer: COMMERCIAL

## 2025-08-27 VITALS
WEIGHT: 195.6 LBS | TEMPERATURE: 97.1 F | OXYGEN SATURATION: 97 % | DIASTOLIC BLOOD PRESSURE: 60 MMHG | HEART RATE: 51 BPM | RESPIRATION RATE: 18 BRPM | SYSTOLIC BLOOD PRESSURE: 100 MMHG | BODY MASS INDEX: 24.32 KG/M2 | HEIGHT: 75 IN

## 2025-08-27 DIAGNOSIS — I48.0 PAROXYSMAL ATRIAL FIBRILLATION (HCC): Primary | ICD-10-CM

## 2025-08-27 PROCEDURE — 93000 ELECTROCARDIOGRAM COMPLETE: CPT | Performed by: INTERNAL MEDICINE

## 2025-08-27 PROCEDURE — G2211 COMPLEX E/M VISIT ADD ON: HCPCS | Performed by: INTERNAL MEDICINE

## 2025-08-27 PROCEDURE — 99214 OFFICE O/P EST MOD 30 MIN: CPT | Performed by: INTERNAL MEDICINE

## (undated) DEVICE — BLADE CLIPPER GEN PURP NS

## (undated) DEVICE — ELECTRODE PT RET AD L9FT HI MOIST COND ADH HYDRGEL CORDED

## (undated) DEVICE — INSUFFLATION NEEDLE TO ESTABLISH PNEUMOPERITONEUM.: Brand: INSUFFLATION NEEDLE

## (undated) DEVICE — DOUBLE BASIN SET: Brand: MEDLINE INDUSTRIES, INC.

## (undated) DEVICE — CANNULA SEAL

## (undated) DEVICE — PACK PROCEDURE SURG GEN CUST

## (undated) DEVICE — STANDARD HYPODERMIC NEEDLE,POLYPROPYLENE HUB: Brand: MONOJECT

## (undated) DEVICE — KIT,ANTI FOG,W/SPONGE & FLUID,SOFT PACK: Brand: MEDLINE

## (undated) DEVICE — SET INST DAVINCI LAP

## (undated) DEVICE — SUTURE V-LOC 180 SZ 2-0 L6IN ABSRB GRN GS-22 L27MM 1/2 CIR VLOCL2105

## (undated) DEVICE — GOWN,SIRUS,FABRNF,XL,20/CS: Brand: MEDLINE

## (undated) DEVICE — [HIGH FLOW INSUFFLATOR,  DO NOT USE IF PACKAGE IS DAMAGED,  KEEP DRY,  KEEP AWAY FROM SUNLIGHT,  PROTECT FROM HEAT AND RADIOACTIVE SOURCES.]: Brand: PNEUMOSURE

## (undated) DEVICE — COLUMN DRAPE

## (undated) DEVICE — SKIN AFFIX SURG ADHESIVE 72/CS 0.55ML: Brand: MEDLINE

## (undated) DEVICE — SOLUTION IV IRRIG POUR BRL 0.9% SODIUM CHL 2F7124

## (undated) DEVICE — CHLORAPREP 26ML ORANGE

## (undated) DEVICE — ARM DRAPE

## (undated) DEVICE — SYRINGE 20ML LL S/C 50

## (undated) DEVICE — INTENDED FOR TISSUE SEPARATION, AND OTHER PROCEDURES THAT REQUIRE A SHARP SURGICAL BLADE TO PUNCTURE OR CUT.: Brand: BARD-PARKER ® STAINLESS STEEL BLADES

## (undated) DEVICE — NEEDLE CLOSURE OMNICLOSE

## (undated) DEVICE — DRAPE,LAP,CHOLE,W/TROUGHS,STERILE: Brand: MEDLINE

## (undated) DEVICE — TOTAL TRAY, DB, 100% SILI FOLEY, 16FR 10: Brand: MEDLINE